# Patient Record
Sex: MALE | URBAN - METROPOLITAN AREA
[De-identification: names, ages, dates, MRNs, and addresses within clinical notes are randomized per-mention and may not be internally consistent; named-entity substitution may affect disease eponyms.]

---

## 2022-06-28 ENCOUNTER — APPOINTMENT (OUTPATIENT)
Dept: RADIOLOGY | Facility: MEDICAL CENTER | Age: 37
End: 2022-06-28
Attending: NEUROLOGICAL SURGERY
Payer: COMMERCIAL

## 2022-06-28 ENCOUNTER — ANESTHESIA EVENT (OUTPATIENT)
Dept: SURGERY | Facility: MEDICAL CENTER | Age: 37
End: 2022-06-28
Payer: COMMERCIAL

## 2022-06-28 ENCOUNTER — HOSPITAL ENCOUNTER (OUTPATIENT)
Facility: MEDICAL CENTER | Age: 37
End: 2022-06-29
Attending: NEUROLOGICAL SURGERY | Admitting: NEUROLOGICAL SURGERY
Payer: COMMERCIAL

## 2022-06-28 ENCOUNTER — ANESTHESIA (OUTPATIENT)
Dept: SURGERY | Facility: MEDICAL CENTER | Age: 37
End: 2022-06-28
Payer: COMMERCIAL

## 2022-06-28 DIAGNOSIS — G89.18 POSTOPERATIVE PAIN: ICD-10-CM

## 2022-06-28 PROBLEM — M54.12 CERVICAL RADICULOPATHY: Status: ACTIVE | Noted: 2022-06-28

## 2022-06-28 LAB
ANION GAP SERPL CALC-SCNC: 11 MMOL/L (ref 7–16)
APTT PPP: 27.7 SEC (ref 24.7–36)
BUN SERPL-MCNC: 10 MG/DL (ref 8–22)
CALCIUM SERPL-MCNC: 8.7 MG/DL (ref 8.5–10.5)
CHLORIDE SERPL-SCNC: 107 MMOL/L (ref 96–112)
CO2 SERPL-SCNC: 23 MMOL/L (ref 20–33)
CREAT SERPL-MCNC: 0.59 MG/DL (ref 0.5–1.4)
EKG IMPRESSION: NORMAL
ERYTHROCYTE [DISTWIDTH] IN BLOOD BY AUTOMATED COUNT: 39 FL (ref 35.9–50)
GFR SERPLBLD CREATININE-BSD FMLA CKD-EPI: 127 ML/MIN/1.73 M 2
GLUCOSE SERPL-MCNC: 81 MG/DL (ref 65–99)
HCT VFR BLD AUTO: 43 % (ref 42–52)
HGB BLD-MCNC: 14.7 G/DL (ref 14–18)
INR PPP: 0.99 (ref 0.87–1.13)
MCH RBC QN AUTO: 30.1 PG (ref 27–33)
MCHC RBC AUTO-ENTMCNC: 34.2 G/DL (ref 33.7–35.3)
MCV RBC AUTO: 87.9 FL (ref 81.4–97.8)
PLATELET # BLD AUTO: 143 K/UL (ref 164–446)
PMV BLD AUTO: 10.4 FL (ref 9–12.9)
POTASSIUM SERPL-SCNC: 3.8 MMOL/L (ref 3.6–5.5)
PROTHROMBIN TIME: 12.8 SEC (ref 12–14.6)
RBC # BLD AUTO: 4.89 M/UL (ref 4.7–6.1)
SODIUM SERPL-SCNC: 141 MMOL/L (ref 135–145)
WBC # BLD AUTO: 5.2 K/UL (ref 4.8–10.8)

## 2022-06-28 PROCEDURE — C1889 IMPLANT/INSERT DEVICE, NOC: HCPCS | Performed by: NEUROLOGICAL SURGERY

## 2022-06-28 PROCEDURE — 85610 PROTHROMBIN TIME: CPT

## 2022-06-28 PROCEDURE — 85730 THROMBOPLASTIN TIME PARTIAL: CPT

## 2022-06-28 PROCEDURE — 96365 THER/PROPH/DIAG IV INF INIT: CPT

## 2022-06-28 PROCEDURE — C1713 ANCHOR/SCREW BN/BN,TIS/BN: HCPCS | Performed by: NEUROLOGICAL SURGERY

## 2022-06-28 PROCEDURE — 700102 HCHG RX REV CODE 250 W/ 637 OVERRIDE(OP): Performed by: PHYSICIAN ASSISTANT

## 2022-06-28 PROCEDURE — 85027 COMPLETE CBC AUTOMATED: CPT

## 2022-06-28 PROCEDURE — G0378 HOSPITAL OBSERVATION PER HR: HCPCS

## 2022-06-28 PROCEDURE — 700101 HCHG RX REV CODE 250: Performed by: ANESTHESIOLOGY

## 2022-06-28 PROCEDURE — 71046 X-RAY EXAM CHEST 2 VIEWS: CPT

## 2022-06-28 PROCEDURE — 96375 TX/PRO/DX INJ NEW DRUG ADDON: CPT

## 2022-06-28 PROCEDURE — 700105 HCHG RX REV CODE 258: Performed by: NEUROLOGICAL SURGERY

## 2022-06-28 PROCEDURE — 110454 HCHG SHELL REV 250: Performed by: NEUROLOGICAL SURGERY

## 2022-06-28 PROCEDURE — 160029 HCHG SURGERY MINUTES - 1ST 30 MINS LEVEL 4: Performed by: NEUROLOGICAL SURGERY

## 2022-06-28 PROCEDURE — 700111 HCHG RX REV CODE 636 W/ 250 OVERRIDE (IP): Performed by: ANESTHESIOLOGY

## 2022-06-28 PROCEDURE — 700111 HCHG RX REV CODE 636 W/ 250 OVERRIDE (IP): Performed by: NEUROLOGICAL SURGERY

## 2022-06-28 PROCEDURE — 80048 BASIC METABOLIC PNL TOTAL CA: CPT

## 2022-06-28 PROCEDURE — 700101 HCHG RX REV CODE 250: Performed by: NEUROLOGICAL SURGERY

## 2022-06-28 PROCEDURE — 72040 X-RAY EXAM NECK SPINE 2-3 VW: CPT

## 2022-06-28 PROCEDURE — 700105 HCHG RX REV CODE 258: Performed by: ANESTHESIOLOGY

## 2022-06-28 PROCEDURE — 93005 ELECTROCARDIOGRAM TRACING: CPT | Performed by: NEUROLOGICAL SURGERY

## 2022-06-28 PROCEDURE — 700101 HCHG RX REV CODE 250: Performed by: PHYSICIAN ASSISTANT

## 2022-06-28 PROCEDURE — 160009 HCHG ANES TIME/MIN: Performed by: NEUROLOGICAL SURGERY

## 2022-06-28 PROCEDURE — 00600 ANES PX CRV SPINE&CORD NOS: CPT | Performed by: ANESTHESIOLOGY

## 2022-06-28 PROCEDURE — 700102 HCHG RX REV CODE 250 W/ 637 OVERRIDE(OP): Performed by: ANESTHESIOLOGY

## 2022-06-28 PROCEDURE — 160036 HCHG PACU - EA ADDL 30 MINS PHASE I: Performed by: NEUROLOGICAL SURGERY

## 2022-06-28 PROCEDURE — 502000 HCHG MISC OR IMPLANTS RC 0278: Performed by: NEUROLOGICAL SURGERY

## 2022-06-28 PROCEDURE — 110371 HCHG SHELL REV 272: Performed by: NEUROLOGICAL SURGERY

## 2022-06-28 PROCEDURE — 700111 HCHG RX REV CODE 636 W/ 250 OVERRIDE (IP): Performed by: PHYSICIAN ASSISTANT

## 2022-06-28 PROCEDURE — 160048 HCHG OR STATISTICAL LEVEL 1-5: Performed by: NEUROLOGICAL SURGERY

## 2022-06-28 PROCEDURE — 93010 ELECTROCARDIOGRAM REPORT: CPT | Performed by: INTERNAL MEDICINE

## 2022-06-28 PROCEDURE — 160041 HCHG SURGERY MINUTES - EA ADDL 1 MIN LEVEL 4: Performed by: NEUROLOGICAL SURGERY

## 2022-06-28 PROCEDURE — 160035 HCHG PACU - 1ST 60 MINS PHASE I: Performed by: NEUROLOGICAL SURGERY

## 2022-06-28 PROCEDURE — 160002 HCHG RECOVERY MINUTES (STAT): Performed by: NEUROLOGICAL SURGERY

## 2022-06-28 PROCEDURE — A9270 NON-COVERED ITEM OR SERVICE: HCPCS | Performed by: ANESTHESIOLOGY

## 2022-06-28 PROCEDURE — A9270 NON-COVERED ITEM OR SERVICE: HCPCS | Performed by: PHYSICIAN ASSISTANT

## 2022-06-28 DEVICE — IMPLANTABLE DEVICE: Type: IMPLANTABLE DEVICE | Site: SPINE CERVICAL | Status: FUNCTIONAL

## 2022-06-28 RX ORDER — METHOCARBAMOL 750 MG/1
750 TABLET, FILM COATED ORAL EVERY 8 HOURS PRN
Status: DISCONTINUED | OUTPATIENT
Start: 2022-06-28 | End: 2022-06-29 | Stop reason: HOSPADM

## 2022-06-28 RX ORDER — CLINDAMYCIN PHOSPHATE 900 MG/50ML
900 INJECTION, SOLUTION INTRAVENOUS EVERY 8 HOURS
Status: COMPLETED | OUTPATIENT
Start: 2022-06-28 | End: 2022-06-29

## 2022-06-28 RX ORDER — ONDANSETRON 2 MG/ML
4 INJECTION INTRAMUSCULAR; INTRAVENOUS EVERY 4 HOURS PRN
Status: DISCONTINUED | OUTPATIENT
Start: 2022-06-28 | End: 2022-06-29 | Stop reason: HOSPADM

## 2022-06-28 RX ORDER — BISACODYL 10 MG
10 SUPPOSITORY, RECTAL RECTAL
Status: DISCONTINUED | OUTPATIENT
Start: 2022-06-28 | End: 2022-06-29 | Stop reason: HOSPADM

## 2022-06-28 RX ORDER — AMOXICILLIN 250 MG
1 CAPSULE ORAL NIGHTLY
Status: DISCONTINUED | OUTPATIENT
Start: 2022-06-28 | End: 2022-06-29 | Stop reason: HOSPADM

## 2022-06-28 RX ORDER — CYCLOBENZAPRINE HCL 10 MG
10 TABLET ORAL EVERY 8 HOURS PRN
Status: DISCONTINUED | OUTPATIENT
Start: 2022-06-28 | End: 2022-06-29 | Stop reason: HOSPADM

## 2022-06-28 RX ORDER — HYDROMORPHONE HYDROCHLORIDE 2 MG/ML
INJECTION, SOLUTION INTRAMUSCULAR; INTRAVENOUS; SUBCUTANEOUS PRN
Status: DISCONTINUED | OUTPATIENT
Start: 2022-06-28 | End: 2022-06-28 | Stop reason: SURG

## 2022-06-28 RX ORDER — DIPHENHYDRAMINE HCL 25 MG
25 TABLET ORAL EVERY 6 HOURS PRN
Status: DISCONTINUED | OUTPATIENT
Start: 2022-06-28 | End: 2022-06-29 | Stop reason: HOSPADM

## 2022-06-28 RX ORDER — OXYCODONE HCL 5 MG/5 ML
10 SOLUTION, ORAL ORAL
Status: COMPLETED | OUTPATIENT
Start: 2022-06-28 | End: 2022-06-28

## 2022-06-28 RX ORDER — VALPROIC ACID 250 MG/1
500 CAPSULE, LIQUID FILLED ORAL 2 TIMES DAILY
Status: DISCONTINUED | OUTPATIENT
Start: 2022-06-28 | End: 2022-06-29 | Stop reason: HOSPADM

## 2022-06-28 RX ORDER — OXCARBAZEPINE 600 MG/1
600 TABLET, FILM COATED ORAL 2 TIMES DAILY
COMMUNITY

## 2022-06-28 RX ORDER — HYDROMORPHONE HYDROCHLORIDE 1 MG/ML
0.2 INJECTION, SOLUTION INTRAMUSCULAR; INTRAVENOUS; SUBCUTANEOUS
Status: DISCONTINUED | OUTPATIENT
Start: 2022-06-28 | End: 2022-06-28 | Stop reason: HOSPADM

## 2022-06-28 RX ORDER — LIDOCAINE HYDROCHLORIDE 20 MG/ML
INJECTION, SOLUTION EPIDURAL; INFILTRATION; INTRACAUDAL; PERINEURAL PRN
Status: DISCONTINUED | OUTPATIENT
Start: 2022-06-28 | End: 2022-06-28 | Stop reason: SURG

## 2022-06-28 RX ORDER — DEXTROSE MONOHYDRATE, SODIUM CHLORIDE, AND POTASSIUM CHLORIDE 50; 1.49; 9 G/1000ML; G/1000ML; G/1000ML
INJECTION, SOLUTION INTRAVENOUS CONTINUOUS
Status: DISCONTINUED | OUTPATIENT
Start: 2022-06-28 | End: 2022-06-29 | Stop reason: HOSPADM

## 2022-06-28 RX ORDER — AMOXICILLIN 250 MG
1 CAPSULE ORAL
Status: DISCONTINUED | OUTPATIENT
Start: 2022-06-28 | End: 2022-06-29 | Stop reason: HOSPADM

## 2022-06-28 RX ORDER — DEXAMETHASONE SODIUM PHOSPHATE 4 MG/ML
INJECTION, SOLUTION INTRA-ARTICULAR; INTRALESIONAL; INTRAMUSCULAR; INTRAVENOUS; SOFT TISSUE PRN
Status: DISCONTINUED | OUTPATIENT
Start: 2022-06-28 | End: 2022-06-28 | Stop reason: SURG

## 2022-06-28 RX ORDER — ROCURONIUM BROMIDE 10 MG/ML
INJECTION, SOLUTION INTRAVENOUS PRN
Status: DISCONTINUED | OUTPATIENT
Start: 2022-06-28 | End: 2022-06-28 | Stop reason: SURG

## 2022-06-28 RX ORDER — MIRTAZAPINE 15 MG/1
45 TABLET, FILM COATED ORAL NIGHTLY
Status: DISCONTINUED | OUTPATIENT
Start: 2022-06-28 | End: 2022-06-29 | Stop reason: HOSPADM

## 2022-06-28 RX ORDER — HALOPERIDOL 5 MG/ML
1 INJECTION INTRAMUSCULAR
Status: DISCONTINUED | OUTPATIENT
Start: 2022-06-28 | End: 2022-06-28 | Stop reason: HOSPADM

## 2022-06-28 RX ORDER — SODIUM CHLORIDE, SODIUM LACTATE, POTASSIUM CHLORIDE, CALCIUM CHLORIDE 600; 310; 30; 20 MG/100ML; MG/100ML; MG/100ML; MG/100ML
INJECTION, SOLUTION INTRAVENOUS CONTINUOUS
Status: DISCONTINUED | OUTPATIENT
Start: 2022-06-28 | End: 2022-06-28 | Stop reason: HOSPADM

## 2022-06-28 RX ORDER — HYDROMORPHONE HYDROCHLORIDE 1 MG/ML
0.4 INJECTION, SOLUTION INTRAMUSCULAR; INTRAVENOUS; SUBCUTANEOUS
Status: DISCONTINUED | OUTPATIENT
Start: 2022-06-28 | End: 2022-06-28 | Stop reason: HOSPADM

## 2022-06-28 RX ORDER — ONDANSETRON 2 MG/ML
4 INJECTION INTRAMUSCULAR; INTRAVENOUS
Status: DISCONTINUED | OUTPATIENT
Start: 2022-06-28 | End: 2022-06-28 | Stop reason: HOSPADM

## 2022-06-28 RX ORDER — MIDAZOLAM HYDROCHLORIDE 1 MG/ML
INJECTION INTRAMUSCULAR; INTRAVENOUS PRN
Status: DISCONTINUED | OUTPATIENT
Start: 2022-06-28 | End: 2022-06-28 | Stop reason: SURG

## 2022-06-28 RX ORDER — LACTULOSE 20 G/30ML
20 SOLUTION ORAL 3 TIMES DAILY PRN
Status: DISCONTINUED | OUTPATIENT
Start: 2022-06-28 | End: 2022-06-29 | Stop reason: HOSPADM

## 2022-06-28 RX ORDER — ENEMA 19; 7 G/133ML; G/133ML
1 ENEMA RECTAL
Status: DISCONTINUED | OUTPATIENT
Start: 2022-06-28 | End: 2022-06-29 | Stop reason: HOSPADM

## 2022-06-28 RX ORDER — OXCARBAZEPINE 300 MG/1
600 TABLET, FILM COATED ORAL 2 TIMES DAILY
Status: DISCONTINUED | OUTPATIENT
Start: 2022-06-28 | End: 2022-06-29 | Stop reason: HOSPADM

## 2022-06-28 RX ORDER — OXYCODONE HYDROCHLORIDE 10 MG/1
10 TABLET ORAL
Status: DISCONTINUED | OUTPATIENT
Start: 2022-06-28 | End: 2022-06-29 | Stop reason: HOSPADM

## 2022-06-28 RX ORDER — PRAZOSIN HYDROCHLORIDE 1 MG/1
3 CAPSULE ORAL NIGHTLY
COMMUNITY

## 2022-06-28 RX ORDER — HYDROMORPHONE HYDROCHLORIDE 1 MG/ML
0.1 INJECTION, SOLUTION INTRAMUSCULAR; INTRAVENOUS; SUBCUTANEOUS
Status: DISCONTINUED | OUTPATIENT
Start: 2022-06-28 | End: 2022-06-28 | Stop reason: HOSPADM

## 2022-06-28 RX ORDER — TRAMADOL HYDROCHLORIDE 50 MG/1
50 TABLET ORAL EVERY 8 HOURS PRN
Status: ON HOLD | COMMUNITY
End: 2022-06-28

## 2022-06-28 RX ORDER — DOCUSATE SODIUM 100 MG/1
100 CAPSULE, LIQUID FILLED ORAL 2 TIMES DAILY
Status: DISCONTINUED | OUTPATIENT
Start: 2022-06-28 | End: 2022-06-29 | Stop reason: HOSPADM

## 2022-06-28 RX ORDER — DIPHENHYDRAMINE HYDROCHLORIDE 50 MG/ML
25 INJECTION INTRAMUSCULAR; INTRAVENOUS EVERY 6 HOURS PRN
Status: DISCONTINUED | OUTPATIENT
Start: 2022-06-28 | End: 2022-06-29 | Stop reason: HOSPADM

## 2022-06-28 RX ORDER — VALPROIC ACID 250 MG/1
750 CAPSULE, LIQUID FILLED ORAL
Status: DISCONTINUED | OUTPATIENT
Start: 2022-06-28 | End: 2022-06-29 | Stop reason: HOSPADM

## 2022-06-28 RX ORDER — QUETIAPINE FUMARATE 200 MG/1
200 TABLET, FILM COATED ORAL
Status: DISCONTINUED | OUTPATIENT
Start: 2022-06-28 | End: 2022-06-29 | Stop reason: HOSPADM

## 2022-06-28 RX ORDER — ACETAMINOPHEN 500 MG
1000 TABLET ORAL EVERY 6 HOURS PRN
Status: DISCONTINUED | OUTPATIENT
Start: 2022-07-03 | End: 2022-06-29 | Stop reason: HOSPADM

## 2022-06-28 RX ORDER — VANCOMYCIN HYDROCHLORIDE 1 G/20ML
INJECTION, POWDER, LYOPHILIZED, FOR SOLUTION INTRAVENOUS
Status: COMPLETED | OUTPATIENT
Start: 2022-06-28 | End: 2022-06-28

## 2022-06-28 RX ORDER — SODIUM CHLORIDE, SODIUM LACTATE, POTASSIUM CHLORIDE, CALCIUM CHLORIDE 600; 310; 30; 20 MG/100ML; MG/100ML; MG/100ML; MG/100ML
INJECTION, SOLUTION INTRAVENOUS CONTINUOUS
Status: ACTIVE | OUTPATIENT
Start: 2022-06-28 | End: 2022-06-28

## 2022-06-28 RX ORDER — CELECOXIB 200 MG/1
400 CAPSULE ORAL ONCE
Status: DISCONTINUED | OUTPATIENT
Start: 2022-06-28 | End: 2022-06-28

## 2022-06-28 RX ORDER — OXYCODONE HYDROCHLORIDE 5 MG/1
5 TABLET ORAL
Status: DISCONTINUED | OUTPATIENT
Start: 2022-06-28 | End: 2022-06-29 | Stop reason: HOSPADM

## 2022-06-28 RX ORDER — QUETIAPINE FUMARATE 50 MG/1
50 TABLET, FILM COATED ORAL
COMMUNITY

## 2022-06-28 RX ORDER — MEPERIDINE HYDROCHLORIDE 25 MG/ML
12.5 INJECTION INTRAMUSCULAR; INTRAVENOUS; SUBCUTANEOUS
Status: DISCONTINUED | OUTPATIENT
Start: 2022-06-28 | End: 2022-06-28 | Stop reason: HOSPADM

## 2022-06-28 RX ORDER — LACTULOSE 10 G/15ML
20 SOLUTION ORAL 3 TIMES DAILY PRN
COMMUNITY

## 2022-06-28 RX ORDER — OXYCODONE HYDROCHLORIDE AND ACETAMINOPHEN 5; 325 MG/1; MG/1
1 TABLET ORAL EVERY 4 HOURS PRN
Qty: 42 TABLET | Refills: 0 | Status: SHIPPED | OUTPATIENT
Start: 2022-06-28 | End: 2022-07-05

## 2022-06-28 RX ORDER — DIAZEPAM 5 MG/1
5 TABLET ORAL EVERY 4 HOURS PRN
Status: DISCONTINUED | OUTPATIENT
Start: 2022-06-28 | End: 2022-06-29 | Stop reason: HOSPADM

## 2022-06-28 RX ORDER — ACETAMINOPHEN 500 MG
1000 TABLET ORAL EVERY 6 HOURS
Status: DISCONTINUED | OUTPATIENT
Start: 2022-06-28 | End: 2022-06-29 | Stop reason: HOSPADM

## 2022-06-28 RX ORDER — HYDROMORPHONE HYDROCHLORIDE 1 MG/ML
0.5 INJECTION, SOLUTION INTRAMUSCULAR; INTRAVENOUS; SUBCUTANEOUS
Status: DISCONTINUED | OUTPATIENT
Start: 2022-06-28 | End: 2022-06-29 | Stop reason: HOSPADM

## 2022-06-28 RX ORDER — MIRTAZAPINE 45 MG/1
45 TABLET, FILM COATED ORAL NIGHTLY
COMMUNITY

## 2022-06-28 RX ORDER — QUETIAPINE FUMARATE 200 MG/1
200 TABLET, FILM COATED ORAL
COMMUNITY

## 2022-06-28 RX ORDER — QUETIAPINE FUMARATE 50 MG/1
50 TABLET, FILM COATED ORAL
Status: DISCONTINUED | OUTPATIENT
Start: 2022-06-28 | End: 2022-06-29 | Stop reason: HOSPADM

## 2022-06-28 RX ORDER — POLYETHYLENE GLYCOL 3350 17 G/17G
1 POWDER, FOR SOLUTION ORAL 2 TIMES DAILY PRN
Status: DISCONTINUED | OUTPATIENT
Start: 2022-06-28 | End: 2022-06-29 | Stop reason: HOSPADM

## 2022-06-28 RX ORDER — ACETAMINOPHEN 500 MG
1000 TABLET ORAL ONCE
Status: DISCONTINUED | OUTPATIENT
Start: 2022-06-28 | End: 2022-06-28 | Stop reason: HOSPADM

## 2022-06-28 RX ORDER — CLINDAMYCIN PHOSPHATE 150 MG/ML
INJECTION, SOLUTION INTRAVENOUS PRN
Status: DISCONTINUED | OUTPATIENT
Start: 2022-06-28 | End: 2022-06-28 | Stop reason: SURG

## 2022-06-28 RX ORDER — LABETALOL HYDROCHLORIDE 5 MG/ML
10 INJECTION, SOLUTION INTRAVENOUS
Status: DISCONTINUED | OUTPATIENT
Start: 2022-06-28 | End: 2022-06-29 | Stop reason: HOSPADM

## 2022-06-28 RX ORDER — ONDANSETRON 2 MG/ML
INJECTION INTRAMUSCULAR; INTRAVENOUS PRN
Status: DISCONTINUED | OUTPATIENT
Start: 2022-06-28 | End: 2022-06-28 | Stop reason: SURG

## 2022-06-28 RX ORDER — ONDANSETRON 4 MG/1
4 TABLET, ORALLY DISINTEGRATING ORAL EVERY 4 HOURS PRN
Status: DISCONTINUED | OUTPATIENT
Start: 2022-06-28 | End: 2022-06-29 | Stop reason: HOSPADM

## 2022-06-28 RX ORDER — CALCIUM CARBONATE 500 MG/1
500 TABLET, CHEWABLE ORAL 2 TIMES DAILY
Status: DISCONTINUED | OUTPATIENT
Start: 2022-06-28 | End: 2022-06-29 | Stop reason: HOSPADM

## 2022-06-28 RX ORDER — PROMETHAZINE HYDROCHLORIDE 25 MG/1
12.5-25 TABLET ORAL EVERY 4 HOURS PRN
Status: DISCONTINUED | OUTPATIENT
Start: 2022-06-28 | End: 2022-06-29 | Stop reason: HOSPADM

## 2022-06-28 RX ORDER — VALPROIC ACID 250 MG/1
500 CAPSULE, LIQUID FILLED ORAL 2 TIMES DAILY
COMMUNITY

## 2022-06-28 RX ORDER — OXYCODONE HCL 5 MG/5 ML
5 SOLUTION, ORAL ORAL
Status: COMPLETED | OUTPATIENT
Start: 2022-06-28 | End: 2022-06-28

## 2022-06-28 RX ORDER — PROCHLORPERAZINE EDISYLATE 5 MG/ML
5-10 INJECTION INTRAMUSCULAR; INTRAVENOUS EVERY 4 HOURS PRN
Status: DISCONTINUED | OUTPATIENT
Start: 2022-06-28 | End: 2022-06-29 | Stop reason: HOSPADM

## 2022-06-28 RX ORDER — PROMETHAZINE HYDROCHLORIDE 25 MG/1
12.5-25 SUPPOSITORY RECTAL EVERY 4 HOURS PRN
Status: DISCONTINUED | OUTPATIENT
Start: 2022-06-28 | End: 2022-06-29 | Stop reason: HOSPADM

## 2022-06-28 RX ORDER — TIZANIDINE 4 MG/1
4 TABLET ORAL EVERY 6 HOURS PRN
Qty: 28 TABLET | Refills: 0 | Status: SHIPPED | OUTPATIENT
Start: 2022-06-28 | End: 2022-07-05

## 2022-06-28 RX ADMIN — OXYCODONE HYDROCHLORIDE 10 MG: 10 TABLET ORAL at 20:47

## 2022-06-28 RX ADMIN — DEXTROSE AND SODIUM CHLORIDE: 5; 900 INJECTION, SOLUTION INTRAVENOUS at 18:11

## 2022-06-28 RX ADMIN — QUETIAPINE FUMARATE 200 MG: 200 TABLET ORAL at 22:10

## 2022-06-28 RX ADMIN — HYDROMORPHONE HYDROCHLORIDE 0.4 MG: 1 INJECTION, SOLUTION INTRAMUSCULAR; INTRAVENOUS; SUBCUTANEOUS at 15:15

## 2022-06-28 RX ADMIN — ONDANSETRON 4 MG: 2 INJECTION INTRAMUSCULAR; INTRAVENOUS at 13:26

## 2022-06-28 RX ADMIN — CALCIUM CARBONATE 500 MG: 500 TABLET, CHEWABLE ORAL at 18:00

## 2022-06-28 RX ADMIN — SUGAMMADEX 200 MG: 100 INJECTION, SOLUTION INTRAVENOUS at 13:24

## 2022-06-28 RX ADMIN — CLINDAMYCIN PHOSPHATE 900 MG: 900 INJECTION, SOLUTION INTRAVENOUS at 22:10

## 2022-06-28 RX ADMIN — CLINDAMYCIN PHOSPHATE 900 MG: 150 INJECTION, SOLUTION INTRAMUSCULAR; INTRAVENOUS at 12:00

## 2022-06-28 RX ADMIN — ROCURONIUM BROMIDE 10 MG: 10 INJECTION, SOLUTION INTRAVENOUS at 12:57

## 2022-06-28 RX ADMIN — DEXAMETHASONE SODIUM PHOSPHATE 8 MG: 4 INJECTION, SOLUTION INTRA-ARTICULAR; INTRALESIONAL; INTRAMUSCULAR; INTRAVENOUS; SOFT TISSUE at 12:20

## 2022-06-28 RX ADMIN — PRAZOSIN HYDROCHLORIDE 3 MG: 1 CAPSULE ORAL at 22:10

## 2022-06-28 RX ADMIN — FENTANYL CITRATE 50 MCG: 50 INJECTION, SOLUTION INTRAMUSCULAR; INTRAVENOUS at 14:00

## 2022-06-28 RX ADMIN — OXCARBAZEPINE 600 MG: 300 TABLET, FILM COATED ORAL at 17:24

## 2022-06-28 RX ADMIN — HYDROMORPHONE HYDROCHLORIDE 1 MG: 2 INJECTION INTRAMUSCULAR; INTRAVENOUS; SUBCUTANEOUS at 13:26

## 2022-06-28 RX ADMIN — LIDOCAINE HYDROCHLORIDE 80 MG: 20 INJECTION, SOLUTION EPIDURAL; INFILTRATION; INTRACAUDAL at 12:00

## 2022-06-28 RX ADMIN — ACETAMINOPHEN 1000 MG: 500 TABLET, FILM COATED ORAL at 17:25

## 2022-06-28 RX ADMIN — MIRTAZAPINE 45 MG: 15 TABLET, FILM COATED ORAL at 22:08

## 2022-06-28 RX ADMIN — MIDAZOLAM HYDROCHLORIDE 2 MG: 1 INJECTION, SOLUTION INTRAMUSCULAR; INTRAVENOUS at 11:49

## 2022-06-28 RX ADMIN — OXYCODONE HYDROCHLORIDE 10 MG: 10 TABLET ORAL at 17:24

## 2022-06-28 RX ADMIN — SENNOSIDES AND DOCUSATE SODIUM 1 TABLET: 50; 8.6 TABLET ORAL at 22:09

## 2022-06-28 RX ADMIN — HYDROMORPHONE HYDROCHLORIDE 1 MG: 2 INJECTION INTRAMUSCULAR; INTRAVENOUS; SUBCUTANEOUS at 12:28

## 2022-06-28 RX ADMIN — QUETIAPINE FUMARATE 50 MG: 200 TABLET ORAL at 22:09

## 2022-06-28 RX ADMIN — HYDROMORPHONE HYDROCHLORIDE 0.4 MG: 1 INJECTION, SOLUTION INTRAMUSCULAR; INTRAVENOUS; SUBCUTANEOUS at 14:31

## 2022-06-28 RX ADMIN — METHOCARBAMOL 750 MG: 750 TABLET ORAL at 17:24

## 2022-06-28 RX ADMIN — HYDROMORPHONE HYDROCHLORIDE 0.4 MG: 1 INJECTION, SOLUTION INTRAMUSCULAR; INTRAVENOUS; SUBCUTANEOUS at 14:26

## 2022-06-28 RX ADMIN — ACETAMINOPHEN 1000 MG: 500 TABLET, FILM COATED ORAL at 23:44

## 2022-06-28 RX ADMIN — ROCURONIUM BROMIDE 10 MG: 10 INJECTION, SOLUTION INTRAVENOUS at 12:41

## 2022-06-28 RX ADMIN — ROCURONIUM BROMIDE 50 MG: 10 INJECTION, SOLUTION INTRAVENOUS at 12:00

## 2022-06-28 RX ADMIN — FENTANYL CITRATE 250 MCG: 50 INJECTION, SOLUTION INTRAMUSCULAR; INTRAVENOUS at 12:00

## 2022-06-28 RX ADMIN — OXYCODONE HYDROCHLORIDE 10 MG: 5 SOLUTION ORAL at 13:59

## 2022-06-28 RX ADMIN — SODIUM CHLORIDE, POTASSIUM CHLORIDE, SODIUM LACTATE AND CALCIUM CHLORIDE: 600; 310; 30; 20 INJECTION, SOLUTION INTRAVENOUS at 14:04

## 2022-06-28 RX ADMIN — DOCUSATE SODIUM 100 MG: 100 CAPSULE, LIQUID FILLED ORAL at 17:23

## 2022-06-28 RX ADMIN — VALPROIC ACID 750 MG: 250 CAPSULE, LIQUID FILLED ORAL at 23:44

## 2022-06-28 RX ADMIN — VALPROIC ACID 500 MG: 250 CAPSULE, LIQUID FILLED ORAL at 18:17

## 2022-06-28 RX ADMIN — PROPOFOL 200 MG: 10 INJECTION, EMULSION INTRAVENOUS at 12:00

## 2022-06-28 RX ADMIN — ROCURONIUM BROMIDE 10 MG: 10 INJECTION, SOLUTION INTRAVENOUS at 12:27

## 2022-06-28 RX ADMIN — SODIUM CHLORIDE, POTASSIUM CHLORIDE, SODIUM LACTATE AND CALCIUM CHLORIDE: 600; 310; 30; 20 INJECTION, SOLUTION INTRAVENOUS at 11:52

## 2022-06-28 RX ADMIN — HYDROMORPHONE HYDROCHLORIDE 0.5 MG: 1 INJECTION, SOLUTION INTRAMUSCULAR; INTRAVENOUS; SUBCUTANEOUS at 22:55

## 2022-06-28 RX ADMIN — FENTANYL CITRATE 50 MCG: 50 INJECTION, SOLUTION INTRAMUSCULAR; INTRAVENOUS at 14:10

## 2022-06-28 RX ADMIN — FENTANYL CITRATE 50 MCG: 50 INJECTION, SOLUTION INTRAMUSCULAR; INTRAVENOUS at 15:32

## 2022-06-28 RX ADMIN — FENTANYL CITRATE 50 MCG: 50 INJECTION, SOLUTION INTRAMUSCULAR; INTRAVENOUS at 15:12

## 2022-06-28 ASSESSMENT — PAIN DESCRIPTION - PAIN TYPE
TYPE: SURGICAL PAIN
TYPE: CHRONIC PAIN
TYPE: SURGICAL PAIN
TYPE: SURGICAL PAIN
TYPE: ACUTE PAIN
TYPE: SURGICAL PAIN
TYPE: SURGICAL PAIN

## 2022-06-28 ASSESSMENT — PAIN SCALES - GENERAL: PAIN_LEVEL: 6

## 2022-06-28 NOTE — PROGRESS NOTES
Med rec updated and complete, per pt with a list of medications    Allergies reviewed, per pt   Pt reports that he does not take any of his KOP

## 2022-06-28 NOTE — ANESTHESIA PREPROCEDURE EVALUATION
Case: [id number] Date/Time: 06/28/22 1145    Procedure: ANTERIOR C5-6 ARTIFICIAL DISC    Pre-op diagnosis: SPINAL STENOSIS IN CERVICAL REGION WITH MYELOPATHY    Location: [facility] OR  / SURGERY [facility]    Surgeons: Rancho Farias M.D.          Relevant Problems   No relevant active problems       Physical Exam    Airway   Mallampati: II  TM distance: >3 FB  Neck ROM: full       Cardiovascular - normal exam  Rhythm: regular  Rate: normal  (-) murmur     Dental   Comments: No upper teeth    Very poor dentition   Pulmonary - normal exam  Breath sounds clear to auscultation     Abdominal    Neurological - normal exam               Anesthesia Plan    ASA 2       Plan - general       Airway plan will be ETT          Induction: intravenous    Postoperative Plan: Postoperative administration of opioids is intended.    Pertinent diagnostic labs and testing reviewed    Informed Consent:    Anesthetic plan and risks discussed with patient.    Use of blood products discussed with: patient whom consented to blood products.

## 2022-06-28 NOTE — ANESTHESIA PROCEDURE NOTES
Airway    Date/Time: 6/28/2022 12:01 PM  Performed by: Horacio aGrcia M.D.  Authorized by: Horacio Garcia M.D.     Location:  OR  Urgency:  Elective  Indications for Airway Management:  Anesthesia      Spontaneous Ventilation: absent    Sedation Level:  Deep  Preoxygenated: Yes    Patient Position:  Sniffing  Mask Difficulty Assessment:  1 - vent by mask  Final Airway Type:  Endotracheal airway  Final Endotracheal Airway:  ETT  Cuffed: Yes    Technique Used for Successful ETT Placement:  Direct laryngoscopy  Devices/Methods Used in Placement:  Intubating stylet    Insertion Site:  Oral  Blade Type:  Wen  Laryngoscope Blade/Videolaryngoscope Blade Size:  2  ETT Size (mm):  7.5  Measured from:  Gums  ETT to Gums (cm):  25  Placement Verified by: auscultation and capnometry    Cormack-Lehane Classification:  Grade IIa - partial view of glottis  Number of Attempts at Approach:  1

## 2022-06-28 NOTE — ANESTHESIA TIME REPORT
Anesthesia Start and Stop Event Times     Date Time Event    6/28/2022 1142 Ready for Procedure     1152 Anesthesia Start     1350 Anesthesia Stop        Responsible Staff  06/28/22    Name Role Begin End    Horacio Garcia M.D. Anesth 1152 1350        Overtime Reason:  no overtime (within assigned shift)    Comments:

## 2022-06-28 NOTE — DISCHARGE INSTRUCTIONS
Discharge Instructions    Discharged to  by car with escort. Discharged via wheelchair, hospital escort: Yes.  Special equipment needed: Not Applicable    Be sure to schedule a follow-up appointment with your primary care doctor or any specialists as instructed.     Discharge Plan:        I understand that a diet low in cholesterol, fat, and sodium is recommended for good health. Unless I have been given specific instructions below for another diet, I accept this instruction as my diet prescription.   Other diet: n/a    Special Instructions: None    Is patient discharged on Warfarin / Coumadin?   No Cervical Disk Replacement    Cervical disk replacement is a surgery to replace one or more damaged disks (cervical disks) between the neck bones (cervical vertebrae) with an artificial disk. Artificial disks may be made with metal or with a combination of metal and plastic. Cervical disks absorb shocks from your movement, connect the spine bones (vertebrae), and help the spine move. Cervical disk replacement is done to help relieve symptoms that are caused by a damaged cervical disk, such as pressure, pain, numbness, or weakness.  Tell a health care provider about:  Any allergies you have.  All medicines you are taking, including vitamins, herbs, eye drops, creams, and over-the-counter medicines.  Any blood disorders you have.  Any problems you or family members have had with anesthetic medicines  Any surgeries you have had  Any medical conditions you have.  Whether you are pregnant or may be pregnant.  What are the risks?  Generally, this is a safe procedure. However, problems may occur, including:  Infection.  Bleeding or a blood clot.  Allergic reactions to medicines.  Damage to other structures or organs, such as surrounding nerves or the tube that moves food from the mouth to the stomach (esophagus).  Difficulty swallowing or breathing.  Voice changes.  Scarring.  The artificial disk breaking or becoming  loose.  Inability to move part of the body (paralysis).  Stroke.  Inability to control when you urinate or have bowel movements (incontinence).  A need for more surgery.  Symptoms that continue after surgery.  What happens before the procedure?  Staying hydrated  Follow instructions from your health care provider about hydration, which may include:  Up to 2 hours before the procedure - you may continue to drink clear liquids, such as water, clear fruit juice, black coffee, and plain tea.  Eating and drinking restrictions  Follow instructions from your health care provider about eating and drinking, which may include:  8 hours before the procedure - stop eating heavy meals or foods such as meat, fried foods, or fatty foods.  6 hours before the procedure - stop eating light meals or foods, such as toast or cereal.  6 hours before the procedure - stop drinking milk or drinks that contain milk.  2 hours before the procedure - stop drinking clear liquids.  Medicines  Ask your health care provider about:  Changing or stopping your regular medicines. This is especially important if you are taking diabetes medicines or blood thinners.  Taking medicines such as aspirin and ibuprofen. These medicines can thin your blood. Do not take these medicines before your procedure if your health care provider instructs you not to.  You may be given antibiotic medicine to help prevent infection.  General instructions  You may have tests, such as:  Blood tests.  Urine tests.  X-rays.  CT scan.  MRI.  You may be asked to shower with a germ-killing soap.  Do not use any products that contain nicotine or tobacco, such as cigarettes and e-cigarettes. If you need help quitting, ask your health care provider.  Plan to have someone take you home from the hospital or clinic.  Arrange for someone to help you with activities during recovery.  What happens during the procedure?  To reduce your risk of infection:  Your health care team will wash or  sanitize their hands.  Your skin will be cleaned with a disinfectant.  An IV tube will be inserted into one of your veins.  You will be given a medicine to make you fall asleep (general anesthetic). You may also be given medicine to help you relax (sedative).  You will be connected to computer screens (monitors) that show your blood pressure, heart rate, and oxygen level.  You will be connected to a machine that helps you breathe (ventilator).  An incision will be made in the front or side of your neck. The incision may be 1-2 inches (2.5-5 cm) long. Incision length varies depending on how many disks are being replaced.  Your damaged disk(s) will be removed, and the artificial disk(s) will be inserted. Artificial disks will be held in place with screws or metal plates in the vertebrae.  Tissue under your skin will be closed with stitches (sutures) that will dissolve on their own over time.  The incision in your skin will be closed with sutures.  A bandage (dressing) will be placed over your incision.  A neck collar (cervical collar) will be placed around your neck. This supports your neck and keeps it from moving while it heals.  The procedure may vary among health care providers and hospitals.  What to expect after the procedure  Your blood pressure, heart rate, breathing rate, and blood oxygen level will be monitored until the medicines you were given have worn off.  You will be given medicine to manage pain and swelling.  You will continue to receive fluids and medicines through an IV tube until you can eat and drink on your own.  You will continue to wear a cervical collar.  You may work with a physical therapist to help you walk.  Do not drive for 24 hours if you were given a sedative, or until your health care provider approves.  Summary  Cervical disk replacement is a surgery to replace one or more damaged disks (cervical disks) between the neck bones with an artificial disk.  This is done to help relieve  symptoms that are caused by a damaged cervical disk, such as pressure, pain, numbness, or weakness.  Before the procedure, follow instructions about staying hydrated.  Before the procedure, follow instructions about eating and drinking restrictions.  The procedure may vary among health care providers and hospitals.  This information is not intended to replace advice given to you by your health care provider. Make sure you discuss any questions you have with your health care provider.  Document Released: 12/23/2014 Document Revised: 11/30/2018 Document Reviewed: 09/13/2017  [employer] Patient Education © 2020 [employer]      Depression / Suicide Risk    As you are discharged from this [facility] facility, it is important to learn how to keep safe from harming yourself.    Recognize the warning signs:  Abrupt changes in personality, positive or negative- including increase in energy   Giving away possessions  Change in eating patterns- significant weight changes-  positive or negative  Change in sleeping patterns- unable to sleep or sleeping all the time   Unwillingness or inability to communicate  Depression  Unusual sadness, discouragement and loneliness  Talk of wanting to die  Neglect of personal appearance   Rebelliousness- reckless behavior  Withdrawal from people/activities they love  Confusion- inability to concentrate     If you or a loved one observes any of these behaviors or has concerns about self-harm, here's what you can do:  Talk about it- your feelings and reasons for harming yourself  Remove any means that you might use to hurt yourself (examples: pills, rope, extension cords, firearm)  Get professional help from the community (Mental Health, Substance Abuse, psychological counseling)  Do not be alone:Call your Safe Contact- someone whom you trust who will be there for you.  Call your local CRISIS HOTLINE 339-4805 or 264-123-8022  Call your local Children's Mobile Crisis Response Team [place] (174) 465-7966 or www.AudioBoo  Call the toll free National Suicide Prevention Hotlines   National Suicide Prevention Lifeline 853-869-JFLC (5529)  [facility] Line Network 800-SUICIDE (656-3601)      Leave incision open to air. Dermabond glue will flake off over the next 2-3 weeks.   OK to shower & pat dry  No soaking in hot tubs, baths, pools, etc.  Avoid repetitive bending, lifting over 10lbs, twisting. We encourage you to take frequent walks as tolerated   Do not take additional acetaminophen (tylenol) without consulting our office.   Do not take NSAIDs (such as ibuprofen or naproxen) or Aspirin unless you have been told otherwise by Dr Farias's team  Follow up at [facility].  Call with questions or concerns @ [phone]

## 2022-06-28 NOTE — PROGRESS NOTES
1346: Patient arrived to PACU in stable condition.  Surgical site to R anterior neck to dermabond, hemovac compressed to self suction at site.  1400:  strength assessed, R hand weaker than left.  Medicated per MAR for pain  1554: Pain at a tolerable level, patient tolerating food and liquid.   1600:  strength unchanged, same as initial PACU exam. Patient complaining of numbness to R hand fingertips.  1613: Report called to Jeni MORIN, patient assigned to [id number].

## 2022-06-28 NOTE — DISCHARGE SUMMARY
Discharge Summary    CHIEF COMPLAINT ON ADMISSION  No chief complaint on file.      Reason for Admission  SPINAL STENOSIS IN CERVICAL REGION*     Admission Date  6/28/2022    CODE STATUS  No Order    HPI & HOSPITAL COURSE  This is a 36 y.o. male here with cervical radiculopathy. Pt presented for elective spine surgery, which proceeded without complication. 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   No notes on file    Therefore, he is discharged in good and stable condition to court or custody of law enforcement.    The patient recovered much more quickly than anticipated on admission.    Discharge Date  6/29/22 if meeting criteria    FOLLOW UP ITEMS POST DISCHARGE  Keep scheduled apt at [facility]     DISCHARGE DIAGNOSES  Active Problems:    * No active hospital problems. *  Resolved Problems:    * No resolved hospital problems. *      FOLLOW UP  No future appointments.  No follow-up provider specified.    MEDICATIONS ON DISCHARGE     Medication List      START taking these medications      Instructions   oxyCODONE-acetaminophen 5-325 MG Tabs  Commonly known as: PERCOCET   Take 1 Tablet by mouth every four hours as needed for Severe Pain for up to 7 days.  Dose: 1 Tablet     tizanidine 4 MG Tabs  Commonly known as: ZANAFLEX   Take 1 Tablet by mouth every 6 hours as needed (spasm) for up to 7 days.  Dose: 4 mg        CONTINUE taking these medications      Instructions   lactulose 10 GM/15ML Soln   Take 20 g by mouth 3 times a day as needed. Indications: Constipation  Dose: 20 g     mirtazapine 45 MG tablet  Commonly known as: REMERON   Take 45 mg by mouth every evening.  Dose: 45 mg     oxcarbazepine 600 MG tablet  Commonly known as: TRILEPTAL   Take 600 mg by mouth 2 times a day.  Dose: 600 mg     prazosin 1 MG Caps  Commonly known as: MINIPRESS   Take 3 mg by mouth every evening.  Dose: 3 mg     * SEROquel 50 MG tablet  Generic drug: QUEtiapine    Take 50 mg by mouth at bedtime. Pt also has an RX for 200MG, pt take 250MG HS  Dose: 50 mg     * QUEtiapine 200 MG Tabs  Commonly known as: Seroquel   Take 200 mg by mouth at bedtime. Pt also has an RX for 50MG, pt take 250MG HS  Dose: 200 mg     valproic acid 250 MG Caps  Commonly known as: DEPAKENE   Take 500 mg by mouth 2 times a day. Pt also takes 750MG in the evening  Dose: 500 mg     VALPROIC ACID PO   Take 750 mg by mouth at bedtime. Pt also take 500MG BID  Dose: 750 mg         * This list has 2 medication(s) that are the same as other medications prescribed for you. Read the directions carefully, and ask your doctor or other care provider to review them with you.            STOP taking these medications    traMADol 50 MG Tabs  Commonly known as: ULTRAM          Rx for percocet and tizanidine in physical scripts provided    Allergies  Allergies   Allergen Reactions   • Ancef [Cefazolin] Anaphylaxis   • Toradol [Ketorolac Tromethamine] Anaphylaxis   • Penicillins Rash and Vomiting     .       DIET  Orders Placed This Encounter   Procedures   • Diet NPO Restrict to: Sips with Medications     Standing Status:   Standing     Number of Occurrences:   1     Order Specific Question:   Diet NPO Restrict to:     Answer:   Sips with Medications [3]       ACTIVITY  Light duty.  Weight bearing as tolerated    CONSULTATIONS       PROCEDURES   C5-6 artificial disc replacement   Dr Rancho Farias  6/28/22    LABORATORY  Lab Results   Component Value Date    SODIUM 141 06/28/2022    POTASSIUM 3.8 06/28/2022    CHLORIDE 107 06/28/2022    CO2 23 06/28/2022    GLUCOSE 81 06/28/2022    BUN 10 06/28/2022    CREATININE 0.59 06/28/2022        Lab Results   Component Value Date    WBC 5.2 06/28/2022    HEMOGLOBIN 14.7 06/28/2022    HEMATOCRIT 43.0 06/28/2022    PLATELETCT 143 (L) 06/28/2022        Total time of the discharge process exceeds 30 minutes.

## 2022-06-29 VITALS
RESPIRATION RATE: 18 BRPM | BODY MASS INDEX: 30.33 KG/M2 | TEMPERATURE: 97.2 F | HEART RATE: 63 BPM | OXYGEN SATURATION: 96 % | SYSTOLIC BLOOD PRESSURE: 125 MMHG | WEIGHT: 228.84 LBS | HEIGHT: 73 IN | DIASTOLIC BLOOD PRESSURE: 74 MMHG

## 2022-06-29 PROCEDURE — 97165 OT EVAL LOW COMPLEX 30 MIN: CPT

## 2022-06-29 PROCEDURE — 97162 PT EVAL MOD COMPLEX 30 MIN: CPT

## 2022-06-29 PROCEDURE — G0378 HOSPITAL OBSERVATION PER HR: HCPCS

## 2022-06-29 PROCEDURE — 700111 HCHG RX REV CODE 636 W/ 250 OVERRIDE (IP): Performed by: PHYSICIAN ASSISTANT

## 2022-06-29 PROCEDURE — A9270 NON-COVERED ITEM OR SERVICE: HCPCS | Performed by: PHYSICIAN ASSISTANT

## 2022-06-29 PROCEDURE — 96376 TX/PRO/DX INJ SAME DRUG ADON: CPT

## 2022-06-29 PROCEDURE — 700102 HCHG RX REV CODE 250 W/ 637 OVERRIDE(OP): Performed by: PHYSICIAN ASSISTANT

## 2022-06-29 PROCEDURE — 700101 HCHG RX REV CODE 250: Performed by: PHYSICIAN ASSISTANT

## 2022-06-29 PROCEDURE — 96366 THER/PROPH/DIAG IV INF ADDON: CPT

## 2022-06-29 RX ADMIN — HYDROMORPHONE HYDROCHLORIDE 0.5 MG: 1 INJECTION, SOLUTION INTRAMUSCULAR; INTRAVENOUS; SUBCUTANEOUS at 06:46

## 2022-06-29 RX ADMIN — OXYCODONE HYDROCHLORIDE 10 MG: 10 TABLET ORAL at 08:34

## 2022-06-29 RX ADMIN — ACETAMINOPHEN 1000 MG: 500 TABLET, FILM COATED ORAL at 05:21

## 2022-06-29 RX ADMIN — DOCUSATE SODIUM 100 MG: 100 CAPSULE, LIQUID FILLED ORAL at 05:21

## 2022-06-29 RX ADMIN — ACETAMINOPHEN 1000 MG: 500 TABLET, FILM COATED ORAL at 11:11

## 2022-06-29 RX ADMIN — HYDROMORPHONE HYDROCHLORIDE 0.5 MG: 1 INJECTION, SOLUTION INTRAMUSCULAR; INTRAVENOUS; SUBCUTANEOUS at 13:57

## 2022-06-29 RX ADMIN — CLINDAMYCIN PHOSPHATE 900 MG: 900 INJECTION, SOLUTION INTRAVENOUS at 05:20

## 2022-06-29 RX ADMIN — OXCARBAZEPINE 600 MG: 300 TABLET, FILM COATED ORAL at 05:20

## 2022-06-29 RX ADMIN — VALPROIC ACID 500 MG: 250 CAPSULE, LIQUID FILLED ORAL at 11:11

## 2022-06-29 RX ADMIN — OXYCODONE HYDROCHLORIDE 10 MG: 10 TABLET ORAL at 12:32

## 2022-06-29 RX ADMIN — METHOCARBAMOL 750 MG: 750 TABLET ORAL at 08:35

## 2022-06-29 RX ADMIN — CALCIUM CARBONATE 500 MG: 500 TABLET, CHEWABLE ORAL at 05:21

## 2022-06-29 RX ADMIN — OXYCODONE HYDROCHLORIDE 10 MG: 10 TABLET ORAL at 05:21

## 2022-06-29 ASSESSMENT — GAIT ASSESSMENTS
DISTANCE (FEET): 30
DEVIATION: SHUFFLED GAIT;BRADYKINETIC;DECREASED HEEL STRIKE;DECREASED TOE OFF
GAIT LEVEL OF ASSIST: CONTACT GUARD ASSIST
ASSISTIVE DEVICE: FRONT WHEEL WALKER

## 2022-06-29 ASSESSMENT — ACTIVITIES OF DAILY LIVING (ADL): TOILETING: INDEPENDENT

## 2022-06-29 ASSESSMENT — COGNITIVE AND FUNCTIONAL STATUS - GENERAL
MOBILITY SCORE: 14
SUGGESTED CMS G CODE MODIFIER DAILY ACTIVITY: CJ
TOILETING: A LITTLE
SUGGESTED CMS G CODE MODIFIER MOBILITY: CL
STANDING UP FROM CHAIR USING ARMS: A LITTLE
WALKING IN HOSPITAL ROOM: A LITTLE
HELP NEEDED FOR BATHING: A LITTLE
SUGGESTED CMS G CODE MODIFIER MOBILITY: CJ
MOBILITY SCORE: 20
TURNING FROM BACK TO SIDE WHILE IN FLAT BAD: A LITTLE
DAILY ACTIVITIY SCORE: 21
CLIMB 3 TO 5 STEPS WITH RAILING: A LOT
SUGGESTED CMS G CODE MODIFIER DAILY ACTIVITY: CJ
CLIMB 3 TO 5 STEPS WITH RAILING: A LITTLE
MOVING TO AND FROM BED TO CHAIR: A LOT
MOVING FROM LYING ON BACK TO SITTING ON SIDE OF FLAT BED: A LOT
DRESSING REGULAR LOWER BODY CLOTHING: A LITTLE
STANDING UP FROM CHAIR USING ARMS: A LITTLE
TURNING FROM BACK TO SIDE WHILE IN FLAT BAD: A LOT
DRESSING REGULAR LOWER BODY CLOTHING: A LITTLE
DAILY ACTIVITIY SCORE: 22
MOVING TO AND FROM BED TO CHAIR: A LITTLE
DRESSING REGULAR UPPER BODY CLOTHING: A LITTLE

## 2022-06-29 ASSESSMENT — PAIN DESCRIPTION - PAIN TYPE
TYPE: SURGICAL PAIN

## 2022-06-29 NOTE — PROGRESS NOTES
4 Eyes Skin Assessment Completed by PATIENCE Ngo and PATIENCE Kincaid.    Head WDL  Ears WDL  Nose WDL  Mouth WDL  Neck Incision C/D/I  Breast/Chest Incision  Shoulder Blades WDL  Spine WDL  (R) Arm/Elbow/Hand WDL  (L) Arm/Elbow/Hand WDL  Abdomen WDL  Groin WDL  Scrotum/Coccyx/Buttocks Redness and Blanching  (R) Leg WDL  (L) Leg WDL  (R) Heel/Foot/Toe WDL  (L) Heel/Foot/Toe WDL          Devices In Places Blood Pressure Cuff, Pulse Ox, SCD's and Nasal Cannula      Interventions In Place Gray Ear Foams, Pillows and Pressure Redistribution Mattress    Possible Skin Injury No    Pictures Uploaded Into Epic N/A  Wound Consult Placed N/A  RN Wound Prevention Protocol Ordered No

## 2022-06-29 NOTE — PROGRESS NOTES
2100 Patient left unit to imaging with 2  and 2 transport staff in a wheelchair. Patient provided with a full tank of oxygen, uses 2Lpm nasal cannula.    2121 Pt back on unit.

## 2022-06-29 NOTE — CARE PLAN
The patient is Stable - Low risk of patient condition declining or worsening    Shift Goals  Clinical Goals: pain mgmt  Patient Goals: comfort    Progress made toward(s) clinical / shift goals:    Problem: Knowledge Deficit - Standard  Goal: Patient and family/care givers will demonstrate understanding of plan of care, disease process/condition, diagnostic tests and medications  Outcome: Progressing  Note: Discussed plan of care with patient. Patient encouraged to communicate needs to staff. Call light is within reach. Patient is A+O x 4 and verbalized understanding.         Patient is not progressing towards the following goals:

## 2022-06-29 NOTE — THERAPY
Physical Therapy   Initial Evaluation     Patient Name: Li Muñizise  Age:  36 y.o., Sex:  male  Medical Record #: [id number]  Today's Date: 6/29/2022     Precautions  Precautions: Fall Risk;Spinal / Back Precautions   Comments: no brace    Assessment  Patient is 36 y.o. male POD #1 elective anterior 5-6 artificial disc placement (no brace ordered). PMH includes epilepsy and recent thoracic epidural hematoma. Pt with pretty severe mobility deficits prior to surgery including ataxia and coordination deficits. Today, pt presented to PT evaluation with generalized weakness, balance deficits, decreased activity tolerance, and coordination deficits. PT will cont while in acute care setting to address deficits. Recommend dc back to [facility] with use of FWW and physical therapy when cleared by MD.     Plan    Recommend Physical Therapy 5 times per week until therapy goals are met for the following treatments:  Bed Mobility, Gait Training, Neuro Re-Education / Balance, Self Care/Home Evaluation, Therapeutic Activities and Therapeutic Exercises    DC Equipment Recommendations: None (recommend use of FWW provided by [facility])  Discharge Recommendations: Recommend outpatient physical therapy services to address higher level deficits          06/29/22 0920   Vitals   O2 (LPM) 0   O2 Delivery Device None - Room Air   Vitals Comments sating 94%   Prior Living Situation   Prior Services Other (Comments)  ([facility])   Housing / Facility Other (Comments)  ([facility])   Steps Into Home 0   Steps In Home 0   Equipment Owned 4-Wheel Walker   Comments pt lives at [facility] and sleeps on a bottom bunk. No need to negotiate stairs. group home has nuring unit and PT avalible   Prior Level of Functional Mobility   Bed Mobility Independent   Transfer Status Independent   Ambulation Independent   Distance Ambulation (Feet)   (short distances in [facility])   Assistive Devices Used 4-Wheel Walker   Comments limtied mobility at baseline   History of Falls    History of Falls Yes   Cognition    Level of Consciousness Alert   Comments cooperative   Active ROM Lower Body    Active ROM Lower Body  WDL   Strength Lower Body   Lower Body Strength  X   Comments generalized weakness R>L   Sensation Lower Body   Lower Extremity Sensation   X   Comments pt reporting R LE sensory deficits which are baseline   Balance Assessment   Sitting Balance (Static) Fair   Sitting Balance (Dynamic) Fair   Standing Balance (Static) Fair -   Standing Balance (Dynamic) Poor +   Weight Shift Sitting Fair   Weight Shift Standing Fair   Comments FWW   Gait Analysis   Gait Level Of Assist Contact Guard Assist   Assistive Device Front Wheel Walker   Distance (Feet) 30   # of Times Distance was Traveled 1   Deviation Shuffled Gait;Bradykinetic;Decreased Heel Strike;Decreased Toe Off   # of Stairs Climbed 0   Weight Bearing Status no restrictions   Comments unsteady, attempting to use 4WW initially at pt choice. Recommend use of FWW   Bed Mobility    Comments received EOB and left EOB   Functional Mobility   Sit to Stand Contact Guard Assist   Bed, Chair, Wheelchair Transfer Contact Guard Assist   Toilet Transfers Contact Guard Assist   Transfer Method Stand Step   Mobility in room with FWW   Short Term Goals    Short Term Goal # 1 pt will be able to complete supine<>Sitting from flat bed with SPV in 6tx in order to improve independence   Short Term Goal # 2 pt will be able to complete functional transfers with FWW and SPV in 6tx in order to decrease fall risk   Short Term Goal # 3 pt will be able to ambulate 75ft with FWW and SPV in 6tx in order to return to prior level   Education Group   Education Provided Role of Physical Therapist;Cervical Precautions   Cervical Precautions Patient Response Patient;Acceptance;Explanation;Demonstration;Action Demonstration;Verbal Demonstration   Role of Physical Therapist Patient Response Patient;Acceptance;Explanation;Demonstration;Action Demonstration;Verbal  Demonstration   Anticipated Discharge Equipment and Recommendations   DC Equipment Recommendations None  (recommend use of FWW provided by retirement)   Discharge Recommendations Recommend outpatient physical therapy services to address higher level deficits

## 2022-06-29 NOTE — ANESTHESIA POSTPROCEDURE EVALUATION
Patient: [place] RV[place]    Procedure Summary     Date: 06/28/22 Room / Location: [facility] 05 / SURGERY [facility]    Anesthesia Start: 1152 Anesthesia Stop: 1350    Procedure: ANTERIOR C5-6 ARTIFICIAL DISC (Neck) Diagnosis: (SPINAL STENOSIS IN CERVICAL REGION WITH MYELOPATHY)    Surgeons: Rancho Farias M.D. Responsible Provider: Horacio Garcia M.D.    Anesthesia Type: general ASA Status: 2          Final Anesthesia Type: general  Last vitals  BP   Blood Pressure: 114/63    Temp   36.3 °C (97.3 °F)    Pulse   77   Resp   18    SpO2   96 %      Anesthesia Post Evaluation    Patient location during evaluation: PACU  Patient participation: complete - patient participated  Level of consciousness: awake and alert  Pain score: 6    Airway patency: patent  Anesthetic complications: no  Cardiovascular status: hemodynamically stable  Respiratory status: acceptable  Hydration status: euvolemic    PONV: none          No complications documented.     Nurse Pain Score: 6 (NPRS)

## 2022-06-29 NOTE — DISCHARGE PLANNING
Care Transition Team Assessment    Information Source  Orientation Level: Oriented X4       Elopement Risk  Legal Hold: No  Ambulatory or Self Mobile in Wheelchair: Yes  Disoriented: No  Psychiatric Symptoms: None  History of Wandering: No  Elopement this Admit: No  Vocalizing Wanting to Leave: No  Displays Behaviors, Body Language Wanting to Leave: No-Not at Risk for Elopement  Elopement Risk: Not at Risk for Elopement    Interdisciplinary Discharge Planning  Lives at [facility].   Patient or legal guardian wants to designate a caregiver: No  Housing / Facility: [facility] staff.  Able to Return to Previous ADL's: Pending PT/OT recs  Mobility Issues: No  Prior Services: [facility] staff.   Durable Medical Equipment: Not Applicable    Discharge Preparedness  What is your plan after discharge?: [facility]  What are your discharge supports?: [facility] staff.  Prior Functional Level: Independent with Activities of Daily Living  Difficulity with ADLs: None  Difficulity with IADLs: None    Functional Assesment  Prior Functional Level: Independent with Activities of Daily Living                        Domestic Abuse  Physical Abuse or Sexual Abuse: No  Verbal Abuse or Emotional Abuse: No  Possible Abuse/Neglect Reported to:: Not Applicable              Anticipated Discharge Information  Discharge Disposition: D/T to institution not listed w/planned hosp IP readmit (62)  Discharge Address: [facility]

## 2022-06-29 NOTE — DISCHARGE PLANNING
Case Management Discharge Planning    Admission Date: 6/28/2022  GMLOS:    ALOS: 0    6-Clicks ADL Score: 22  6-Clicks Mobility Score: 14  PT and/or OT Eval ordered: Yes  Post-acute Referrals Ordered: No  Post-acute Choice Obtained: No  Has referral(s) been sent to post-acute provider:  No      Anticipated Discharge Dispo: Discharge Disposition: Discharged to home/self care (01)  Discharge Address: [facility]    DME Needed: Yes    DME Ordered: No, FWW will be provided at [facility].     Action(s) Taken: Spoke to Carlos MORIN CM at [facility], discussed plan for discharge today, will need FWW and outpatient PT per PT recs. Carlos will set up transport and let CM know on timing, faxed DC summary, progress notes and PT notes to Carlos. CHESTER Miller notified regarding MD report to Blayne Jaime at [facility], x [phone].     Update 1.40pm: Carlos MORIN CM at [facility] confirmed transport  at 4pm today, PATIENCE Nog notified.     Escalations Completed: None    Medically Clear: Yes    Next Steps: Transport set up.     Barriers to Discharge: None

## 2022-06-29 NOTE — PROGRESS NOTES
Neurosurgery Progress Note    Subjective:  Pain controlled, swallowing tolerable  Pt seen by Dr Farias this am    Exam:  A&O, guards at bedside  FS x4  Incision CDI  hvac with minimal output    BP  Min: 102/54  Max: 138/66  Pulse  Av.1  Min: 63  Max: 77  Resp  Av.1  Min: 12  Max: 19  Temp  Av.3 °C (97.3 °F)  Min: 35.9 °C (96.6 °F)  Max: 36.5 °C (97.7 °F)  SpO2  Av.1 %  Min: 93 %  Max: 98 %    No data recorded    Recent Labs     22  1040   WBC 5.2   RBC 4.89   HEMOGLOBIN 14.7   HEMATOCRIT 43.0   MCV 87.9   MCH 30.1   MCHC 34.2   RDW 39.0   PLATELETCT 143*   MPV 10.4     Recent Labs     22  1040   SODIUM 141   POTASSIUM 3.8   CHLORIDE 107   CO2 23   GLUCOSE 81   BUN 10   CREATININE 0.59   CALCIUM 8.7     Recent Labs     22  1040   APTT 27.7   INR 0.99           Intake/Output                       22 0700 - 22 0659 22 0700 - 22 0659     [id number] 3276-1727 Total 3332-2781 7513-5410 Total                 Intake    P.O.  240  240 480  450  -- 450    P.O. 240 240 480 450 -- 450    I.V.  1100  -- 1100  --  -- --    Volume (mL) (lactated ringers infusion) 1100 -- 1100 -- -- --    Total Intake 9928 699 1976 450 -- 450       Output    Urine  --  -- --  800  -- 800    Number of Times Voided -- 1 x 1 x 1 x -- 1 x    Urine Void (mL) -- -- -- 800 -- 800    Drains  0  10 10  --  -- --    Output (mL) (Closed/Suction Drain 1 Anterior Neck Hemovac) 0 10 10 -- -- --    Blood  50  -- 50  --  -- --    Est. Blood Loss 50 -- 50 -- -- --    Total Output 50 10 60 800 -- 800       Net I/O     1930 056 3191 -350 -- -350            Intake/Output Summary (Last 24 hours) at 2022 1031  Last data filed at 2022 0834  Gross per 24 hour   Intake 2030 ml   Output 860 ml   Net 1170 ml            • lactulose  20 g TID PRN   • mirtazapine  45 mg Nightly   • OXcarbazepine  600 mg BID   • prazosin  3 mg Nightly   • QUEtiapine  200 mg QHS   • QUEtiapine  50 mg QHS   • valproic acid  750  mg QHS   • valproic acid  500 mg BID   • Pharmacy Consult Request  1 Each PHARMACY TO DOSE   • MD ALERT...DO NOT ADMINISTER NSAIDS or ASPIRIN unless ORDERED By Neurosurgery  1 Each PRN   • docusate sodium  100 mg BID   • senna-docusate  1 Tablet Nightly   • senna-docusate  1 Tablet Q24HRS PRN   • polyethylene glycol/lytes  1 Packet BID PRN   • magnesium hydroxide  30 mL QDAY PRN   • bisacodyl  10 mg Q24HRS PRN   • sodium phosphate  1 Each Once PRN   • dextrose 5 % and 0.9 % NaCl with KCl 20 mEq   Continuous   • acetaminophen  1,000 mg Q6HRS    Followed by   • [START ON 7/3/2022] acetaminophen  1,000 mg Q6HRS PRN   • oxyCODONE immediate-release  5 mg Q3HRS PRN    Or   • oxyCODONE immediate-release  10 mg Q3HRS PRN    Or   • HYDROmorphone  0.5 mg Q3HRS PRN   • diphenhydrAMINE  25 mg Q6HRS PRN    Or   • diphenhydrAMINE  25 mg Q6HRS PRN   • ondansetron  4 mg Q4HRS PRN   • ondansetron  4 mg Q4HRS PRN   • promethazine  12.5-25 mg Q4HRS PRN   • promethazine  12.5-25 mg Q4HRS PRN   • prochlorperazine  5-10 mg Q4HRS PRN   • methocarbamol  750 mg Q8HRS PRN    Or   • cyclobenzaprine  10 mg Q8HRS PRN    Or   • diazePAM  5 mg Q4HRS PRN   • labetalol  10 mg Q HOUR PRN   • benzocaine-menthol  1 Lozenge Q2HRS PRN   • calcium carbonate  500 mg BID       Assessment and Plan:  Hospital day #2  POD #1 cervical disc arthroplasty  Prophylactic anticoagulation: no         Start date/time: tbd       neuro exam stable, xr completed  Remove hvac  No c collar, mobilize  PT/OT, cleared  Ok to discharge back to half-way today when transportation can be arranged  Rx for percocet and tizanidine in chart    F/pu at [facility] in 2 weeks

## 2022-06-29 NOTE — CARE PLAN
The patient is Watcher - Medium risk of patient condition declining or worsening    Shift Goals  Clinical Goals: pain mgmt  Patient Goals: comfort    Progress made toward(s) clinical / shift goals:    Problem: Pain - Standard  Goal: Alleviation of pain or a reduction in pain to the patient’s comfort goal  Outcome: Progressing  Medicated per MAR with some relief. Non pharmacologic comfort measure include: repositioning, rest.    Problem: Mobility  Goal: Patient's capacity to carry out activities will improve  Outcome: Progressing   Patient able to mobilize POD#0 more than 50ft with his personal walker, SBA with staff.     Patient is not progressing towards the following goals:

## (undated) DEVICE — SUCTION INSTRUMENT YANKAUER BULBOUS TIP W/O VENT (50EA/CA)

## (undated) DEVICE — ELECTRODE DUAL RETURN W/ CORD - (50/PK)

## (undated) DEVICE — SODIUM CHL IRRIGATION 0.9% 1000ML (12EA/CA)

## (undated) DEVICE — DRAPE C ARMOR (12EA/CA)

## (undated) DEVICE — SUTURE 3-0 VICRYL PLUS SH - 8X 18 INCH (12/BX)

## (undated) DEVICE — ELECTRODE 850 FOAM ADHESIVE - HYDROGEL RADIOTRNSPRNT (50/PK)

## (undated) DEVICE — PACK NEURO - (2EA/CA)

## (undated) DEVICE — SCREW DISTRACTION 14MM YELLOW - STERILE (10EA/BX) (5TX4=20)

## (undated) DEVICE — LACTATED RINGERS INJ 1000 ML - (14EA/CA 60CA/PF)

## (undated) DEVICE — SHEET PEDIATRIC LAPAROTOMY - (10/CA)

## (undated) DEVICE — GOWN SURGEONS X-LARGE - DISP. (30/CA)

## (undated) DEVICE — SUTURE GENERAL

## (undated) DEVICE — CHLORAPREP 26 ML APPLICATOR - ORANGE TINT(25/CA)

## (undated) DEVICE — GOWN WARMING STANDARD FLEX - (30/CA)

## (undated) DEVICE — BIT DRILL MATCH HEAD MIDAS REX 15-A 2.2MM X 15CM

## (undated) DEVICE — KIT ANESTHESIA W/CIRCUIT & 3/LT BAG W/FILTER (20EA/CA)

## (undated) DEVICE — GLOVE BIOGEL PI INDICATOR SZ 6.0 SURGICAL PF LF -(200PR/CA)

## (undated) DEVICE — CANISTER SUCTION 3000ML MECHANICAL FILTER AUTO SHUTOFF MEDI-VAC NONSTERILE LF DISP  (40EA/CA)

## (undated) DEVICE — SUTURE 4-0 MONOCRYL PLUS PS-2 - 27 INCH (36/BX)

## (undated) DEVICE — PROTECTOR ULNA NERVE - (36PR/CA)

## (undated) DEVICE — KIT SURGIFLO W/OUT THROMBIN - (6EA/CA)

## (undated) DEVICE — NEPTUNE 4 PORT MANIFOLD - (20/PK)

## (undated) DEVICE — TUBING C&T SET FLYING LEADS DRAIN TUBING (10EA/BX)

## (undated) DEVICE — SENSOR OXIMETER ADULT SPO2 RD SET (20EA/BX)

## (undated) DEVICE — SET LEADWIRE 5 LEAD BEDSIDE DISPOSABLE ECG (1SET OF 5/EA)

## (undated) DEVICE — DERMABOND ADVANCED - (12EA/BX)

## (undated) DEVICE — SET EXTENSION WITH 2 PORTS (48EA/CA) ***PART #2C8610 IS A SUBSTITUTE*****

## (undated) DEVICE — SLEEVE, VASO, THIGH, MED

## (undated) DEVICE — LACTATED RINGERS INJ. 500 ML - (24EA/CA)

## (undated) DEVICE — GLOVE BIOGEL PI ORTHO SZ 6 SURGICAL PF LF (40PR/BX)

## (undated) DEVICE — FORCEPS IRRIGATING 8 X 1.5MM (5EA/BX)

## (undated) DEVICE — HEAD HOLDER JUNIOR/ADULT

## (undated) DEVICE — NEEDLE SPINAL NON-SAFETY 18 GA X 3 IN (25EA/BX)

## (undated) DEVICE — TOWEL STOP TIMEOUT SAFETY FLAG (40EA/CA)

## (undated) DEVICE — TUBING CLEARLINK DUO-VENT - C-FLO (48EA/CA)

## (undated) DEVICE — KIT EVACUATER 3 SPRING PVC LF 1/8 DRAIN SIZE (10EA/CA)"

## (undated) DEVICE — SPONGE PEANUT - (5/PK 50PK/CA)

## (undated) DEVICE — GLOVE BIOGEL SZ 8 SURGICAL PF LTX - (50PR/BX 4BX/CA)

## (undated) DEVICE — MIDAS LUBRICATOR DIFFUSER PACK (4EA/CA)

## (undated) DEVICE — GLOVE BIOGEL INDICATOR SZ 8 SURGICAL PF LTX - (50/BX 4BX/CA)

## (undated) DEVICE — BOVIE BLADE COATED &INSULATED - 25/PK

## (undated) DEVICE — MASK ANESTHESIA ADULT  - (100/CA)

## (undated) DEVICE — SYRINGE NON SAFETY 10 CC 20 GA X 1-1/2 IN (100/BX 4BX/CA)